# Patient Record
Sex: MALE | Race: WHITE | NOT HISPANIC OR LATINO
[De-identification: names, ages, dates, MRNs, and addresses within clinical notes are randomized per-mention and may not be internally consistent; named-entity substitution may affect disease eponyms.]

---

## 2021-03-02 PROBLEM — Z00.00 ENCOUNTER FOR PREVENTIVE HEALTH EXAMINATION: Status: ACTIVE | Noted: 2021-03-02

## 2021-04-01 ENCOUNTER — APPOINTMENT (OUTPATIENT)
Dept: HEART AND VASCULAR | Facility: CLINIC | Age: 56
End: 2021-04-01

## 2021-04-01 DIAGNOSIS — Z83.3 FAMILY HISTORY OF DIABETES MELLITUS: ICD-10-CM

## 2021-04-01 DIAGNOSIS — Z82.49 FAMILY HISTORY OF ISCHEMIC HEART DISEASE AND OTHER DISEASES OF THE CIRCULATORY SYSTEM: ICD-10-CM

## 2021-09-07 ENCOUNTER — APPOINTMENT (OUTPATIENT)
Dept: PULMONOLOGY | Facility: CLINIC | Age: 56
End: 2021-09-07
Payer: COMMERCIAL

## 2021-09-07 VITALS
HEART RATE: 57 BPM | WEIGHT: 240 LBS | HEIGHT: 73 IN | TEMPERATURE: 97.2 F | DIASTOLIC BLOOD PRESSURE: 80 MMHG | BODY MASS INDEX: 31.81 KG/M2 | OXYGEN SATURATION: 98 % | SYSTOLIC BLOOD PRESSURE: 120 MMHG

## 2021-09-07 PROCEDURE — 99204 OFFICE O/P NEW MOD 45 MIN: CPT

## 2021-09-07 RX ORDER — OMEPRAZOLE 20 MG/1
20 CAPSULE, DELAYED RELEASE ORAL
Qty: 90 | Refills: 0 | Status: ACTIVE | COMMUNITY
Start: 2021-05-03

## 2021-09-07 NOTE — HISTORY OF PRESENT ILLNESS
[TextBox_4] : 56 year old male with GERD came in for evaluation of lung nodule\par \par CT coronaries seen, my read: no adenopathy, visualized lung no nodules, no infiltrates\par As per medical records he has a 8 mm nodule in the LtLL.\par \par In February he had CP and went to the hospital and the w/u was negative.\par he was d/c but few days later he had the same symptoms and went to [facility] where a CT chest found to have a lung nodule.\par [place] [facility] [place] 09[place].\par He has rarely dyspnea, he thinks it is allergies. has an old inhaler and takes it as needed. \par he lost weight 20 lbs with diet and exercise.\par he has nasal allergies in the Spring mostly. He knows he is allergic to dogs.\par Denies hemoptysis.\par \par He snores at night, loudly, has observed pauses at night.\par Does not have a good night sleep.\par No naps.\par he could take naps, would doze off.\par Mild nocturia.\par Wakes up gasping for air.\par BT 10-11 pm\par SLT 1 hr\par WT 6-7 am\par \par SHX: never smoked\par Has 3 dogs\par FHX: denies.

## 2021-09-07 NOTE — PHYSICAL EXAM
[No Acute Distress] : no acute distress [Normal Appearance] : normal appearance [No Resp Distress] : no resp distress [No Acc Muscle Use] : no acc muscle use [No Abnormalities] : no abnormalities [Normal Gait] : normal gait [No Clubbing] : no clubbing [No Focal Deficits] : no focal deficits [No Cyanosis] : no cyanosis [Oriented x3] : oriented x3 [Normal Affect] : normal affect

## 2021-09-07 NOTE — REVIEW OF SYSTEMS
[Recent Wt Loss (___ Lbs)] : ~T recent [unfilled] lb weight loss [Dyspnea] : dyspnea [Seasonal Allergies] : seasonal allergies [GERD] : gerd [Nocturia] : nocturia [Negative] : Musculoskeletal

## 2021-09-11 ENCOUNTER — RESULT REVIEW (OUTPATIENT)
Age: 56
End: 2021-09-11

## 2021-09-17 ENCOUNTER — RESULT REVIEW (OUTPATIENT)
Age: 56
End: 2021-09-17

## 2021-10-21 ENCOUNTER — FORM ENCOUNTER (OUTPATIENT)
Age: 56
End: 2021-10-21

## 2021-10-22 ENCOUNTER — TRANSCRIPTION ENCOUNTER (OUTPATIENT)
Age: 56
End: 2021-10-22

## 2021-10-24 ENCOUNTER — APPOINTMENT (OUTPATIENT)
Age: 56
End: 2021-10-24

## 2021-10-25 ENCOUNTER — TRANSCRIPTION ENCOUNTER (OUTPATIENT)
Age: 56
End: 2021-10-25

## 2022-10-18 ENCOUNTER — APPOINTMENT (OUTPATIENT)
Dept: PULMONOLOGY | Facility: CLINIC | Age: 57
End: 2022-10-18

## 2022-10-18 VITALS
DIASTOLIC BLOOD PRESSURE: 60 MMHG | HEART RATE: 57 BPM | SYSTOLIC BLOOD PRESSURE: 120 MMHG | BODY MASS INDEX: 31.81 KG/M2 | HEIGHT: 73 IN | WEIGHT: 240 LBS

## 2022-10-18 PROCEDURE — 99213 OFFICE O/P EST LOW 20 MIN: CPT

## 2022-10-18 PROCEDURE — 99203 OFFICE O/P NEW LOW 30 MIN: CPT

## 2022-10-18 RX ORDER — PANTOPRAZOLE 40 MG/1
40 TABLET, DELAYED RELEASE ORAL
Refills: 0 | Status: DISCONTINUED | COMMUNITY
End: 2022-10-18

## 2022-10-18 NOTE — HISTORY OF PRESENT ILLNESS
[FreeTextEntry1] : Dr. Baez\par 57 year old man  with history of ran, long nodule, covid (patient still has symptoms of long covid), pulm embolism, pulm nodules is here in the sleep center to address sleep apnea. \par Patient  had sleep study 4/2022 - severe sleep apnea with ahi of 37.2.\par He has difficulty using the cpap, he tried with fullface mask and is not able to keep it on.\par has masood machine\par I changed the pressure to 9-12 cm today, he will try again with fullface mask. If the problem persists, I have asked him to use nasal mask. \par \par  Patient is sleepy with [place] sleepiness score of 14.  Patient has very loud snoring, also has witnessed apneas, choking sensations.  Patient's bedtime is around 10-11 PM wakes up in the morning around 6 AM.  He feels tired when he wakes up.  Patient drinks 2 cups of coffee during the daytime. Patient does not have any headaches or nocturia.  He is not sleepy while driving.\par \par If he continues to fail cpap will consider inspire therapy.\par

## 2022-10-18 NOTE — ASSESSMENT
[FreeTextEntry1] : 57-year-old man with severe obstructive sleep apnea has tried CPAP and is not able to use it, patient is taking out the mask in the middle of the night and is waking up with choking sensations even with mask on.\par \par Will change the settings on the CPAP today to 9 to 12 cm, I asked him to try with a full facemask again at this setting and see if there is a problem we can change to a nasal mask.\par \par If patient fails CPAP therapy even with his different settings and masks then we can consider inspire therapy.\par \par Discussed the procedure and next steps for inspire therapy.  We talked about AHI requirements and the BMI requirements for inspire therapy.  We also talked about drug-induced endoscopy and the procedure details.  Explained to him  about short-term side effects from the inspire along with tongue dysfunction and swallowing issues.  Explained to  him that most of the time these issues will resolve with time as the inflammation decreases.  We also talked about long-term complications of the inspire implant.  Patient understood all above and expressed understanding.\par

## 2023-02-17 ENCOUNTER — APPOINTMENT (OUTPATIENT)
Dept: PULMONOLOGY | Facility: CLINIC | Age: 58
End: 2023-02-17
Payer: COMMERCIAL

## 2023-02-17 VITALS
DIASTOLIC BLOOD PRESSURE: 80 MMHG | SYSTOLIC BLOOD PRESSURE: 130 MMHG | WEIGHT: 212 LBS | HEART RATE: 65 BPM | BODY MASS INDEX: 28.1 KG/M2 | HEIGHT: 73 IN | OXYGEN SATURATION: 98 %

## 2023-02-17 PROCEDURE — 99204 OFFICE O/P NEW MOD 45 MIN: CPT

## 2023-02-18 NOTE — PHYSICAL EXAM
[No Acute Distress] : no acute distress [Erythema] : erythema [III] : Mallampati Class: III [Normal Appearance] : normal appearance [No Neck Mass] : no neck mass [Normal Rate/Rhythm] : normal rate/rhythm [Normal S1, S2] : normal s1, s2 [No Murmurs] : no murmurs [No Resp Distress] : no resp distress [Clear to Auscultation Bilaterally] : clear to auscultation bilaterally [No Abnormalities] : no abnormalities [Benign] : benign [Normal Gait] : normal gait [No Clubbing] : no clubbing [No Cyanosis] : no cyanosis [No Edema] : no edema [FROM] : FROM [Normal Color/ Pigmentation] : normal color/ pigmentation [No Focal Deficits] : no focal deficits [Oriented x3] : oriented x3 [Normal Affect] : normal affect [TextBox_68] : bronchial breath sounds mainly in tracheal area.

## 2023-02-18 NOTE — ASSESSMENT
[FreeTextEntry1] : 56 yo M  [facility]  w/ hx of Post-COVID syndrome related chronic dyspnea and PE, VISHAL, presenting to establish care.\par \par > Post-COVID syndrome\par > Chronic nasal congestion\par > PE\par > VISHAL\par \par - Symptoms worsened after discontinuation of maintenance inhaler therapy. Given his lack of significant smoking history and respiratory symptoms starting after COVID, may have developed chronic pulmonary disease after COVID. However, PFT as of 8/12/22 showed no evidence of obstructive or restrictive ventilatory defect but notably with a mildly reduced DLCO at 67%. But this was during the time he was on maintenance inhalers.\par - CPET on 5/2022 showed no obvious cardio or pulmonary limitation but evidence of deconditioning. Notably he has lost about 30lbs since that time but still with persistent symptoms. \par - Will stop chronic prednisone for now given lack of benefit vs potential risks. Will restart trelegy therapy and send for FeNO to see if ICS component is warranted at all. \par - Flonase for nasal congestion.\par - Decreased DLCO in the absence of obvious obstructive or restrictive ventilatory defect may suggest pulmonary hypertension, but patient has no clinical evidence of that. IF present, may be of mild degree related to his sleep apnea. Decreased energy level and fatigability may also be related to interrupted sleep. I have advised him to follow up with his sleep physician to review online CPAP data (if available) to see if there is any evidence of TECSA/ complex sleep apnea, which may be the reason for his CPAP intolerance. \par - Will consider f/u CT scan if above workup does not reveal any obvious reason for his ongoing dyspnea.\par - f/u d-dimer for PE\par - An additional 15 minutes of non face-to-face time has been spent reviewing prior documentation, imaging, and labwork.\par \par \par f/u in 1-2 weeks after trial of flonase, completion of FeNO test. Sooner if needed

## 2023-02-18 NOTE — HISTORY OF PRESENT ILLNESS
[TextBox_4] : 58 yo M  [facility]  w/ hx of Post-COVID syndrome related chronic dyspnea and PE, VISHAL, presenting to [facility] care. \par Patient had severe COVID on 10/2021 and was treated with MAB and dexamethasone at that time. He still remained hypoxic and on 11/2021 went to [place] where he was diagnosed with a LONI PE based on CT. That CT showed multiple consolidations that were attributed to COVID at the time. Since then he has undergone pulmonary rehab as well. \par He is continued on prednisone 10mg and xarelto for his Post-covid syndrome and PE at this time. Historically, he was on Breo and eventually Trelegy with PRN albuterol. Symptoms became worse after Trelegy was discontinued (unclear whether prior pulmonologist stopped or patient self d/c-ed). Currently he still has dyspnea with exertion, productive cough, and some wheezing per partner at bedside. Partner also states that his oxygen saturation goes below 90% when he is asleep, but generally >92% during day time.\par Notably for his VISHAL he has a CPAP machine prescribed but is non-adherent due to mask discomfort. \par \par CT personally reviewed - 11/2021 and 6/2022. In comparison to 11/2021 film, latter film shows resolution of peripheral consolidations that were seen, likely 2/2 COVID. There are reticulation and faint ground glass areas at the area of the former consolidations. No other notable findings. \par \par Notably he had a CT coronaries done on 2/19/2021 which showed an 8mm nodule left lower lung. This has resolved over serial CT's proceeding.

## 2023-02-23 LAB — DEPRECATED D DIMER PPP IA-ACNC: <150 NG/ML DDU

## 2023-03-09 ENCOUNTER — APPOINTMENT (OUTPATIENT)
Dept: PULMONOLOGY | Facility: CLINIC | Age: 58
End: 2023-03-09
Payer: COMMERCIAL

## 2023-03-09 VITALS
HEIGHT: 73 IN | WEIGHT: 212 LBS | HEART RATE: 80 BPM | OXYGEN SATURATION: 97 % | DIASTOLIC BLOOD PRESSURE: 90 MMHG | BODY MASS INDEX: 28.1 KG/M2 | SYSTOLIC BLOOD PRESSURE: 132 MMHG | TEMPERATURE: 98.1 F

## 2023-03-09 PROCEDURE — 99214 OFFICE O/P EST MOD 30 MIN: CPT

## 2023-03-09 NOTE — PHYSICAL EXAM
[No Acute Distress] : no acute distress [Erythema] : erythema [III] : Mallampati Class: III [Normal Appearance] : normal appearance [No Neck Mass] : no neck mass [Normal Rate/Rhythm] : normal rate/rhythm [Normal S1, S2] : normal s1, s2 [No Murmurs] : no murmurs [No Resp Distress] : no resp distress [Clear to Auscultation Bilaterally] : clear to auscultation bilaterally [No Abnormalities] : no abnormalities [Benign] : benign [Normal Gait] : normal gait [No Clubbing] : no clubbing [No Cyanosis] : no cyanosis [No Edema] : no edema [FROM] : FROM [Normal Color/ Pigmentation] : normal color/ pigmentation [No Focal Deficits] : no focal deficits [Oriented x3] : oriented x3 [Normal Affect] : normal affect [TextBox_68] : CTAB bilaterally and improved since last visit

## 2023-03-09 NOTE — HISTORY OF PRESENT ILLNESS
[TextBox_4] : 58 yo M  [facility]  w/ hx of Post-COVID syndrome related chronic dyspnea and PE, VISHAL, presenting to [facility] care. \par Patient had severe COVID on 10/2021 and was treated with MAB and dexamethasone at that time. He still remained hypoxic and on 11/2021 went to [place] where he was diagnosed with a LONI PE based on CT. That CT showed multiple consolidations that were attributed to COVID at the time. Since then he has undergone pulmonary rehab as well. \par He is continued on prednisone 10mg and xarelto for his Post-covid syndrome and PE at this time. Historically, he was on Breo and eventually Trelegy with PRN albuterol. Symptoms became worse after Trelegy was discontinued (unclear whether prior pulmonologist stopped or patient self d/c-ed). Currently he still has dyspnea with exertion, productive cough, and some wheezing per partner at bedside. Partner also states that his oxygen saturation goes below 90% when he is asleep, but generally >92% during day time.\par Notably for his VISHAL he has a CPAP machine prescribed but is non-adherent due to mask discomfort. \par \par CT personally reviewed - 11/2021 and 6/2022. In comparison to 11/2021 film, latter film shows resolution of peripheral consolidations that were seen, likely 2/2 COVID. There are reticulation and faint ground glass areas at the area of the former consolidations. No other notable findings. \par \par Notably he had a CT coronaries done on 2/19/2021 which showed an 8mm nodule left lower lung. This has resolved over serial CT's proceeding. \par \par 3/9/23\par Patient returning for follow up. Respiratory symptoms with improvement of cough, nasal congestion, and wheezing. Still with exertional dyspnea but unclear whether overall exercise tolerance has increased.

## 2023-03-09 NOTE — ASSESSMENT
[FreeTextEntry1] : 56 yo M  [facility]  w/ hx of Post-COVID syndrome related chronic dyspnea and PE, VISHAL, presenting to establish care.\par \par > Post-COVID syndrome\par > Chronic nasal congestion\par > PE\par > VISHAL\par \par - Trelegy was successful in treating most of his respiratory symptoms and he seldom uses his albuterol inhaler. Again it is currently unclear what his underlying pathology is, but temporally is associated with his prior COVID infection in 11/2021. Will continue with trelegy for now but will do PFT with FeNO to see if there is still persistent inflammation and any evidence of airway obstruction.\par - PFT will also be done to see his DLCO. 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 If PFT shows evidence of more restrictive disease with persistent DLCO reduction, will proceed with CT scan.\par - Prior discussion with another pulmonologist entailed a recurrence of a nodule. He had a LLL nodule on CT coronaries seen in 8/2021. Later CT scans have shown resolution, with the most recent imaging done on 6/2022 with groundglass opacities in the area where the COVID related changes were seen. Unclear whether the ground glass opacities were what the prior pulmonologist mentioned, but will hold off on CT imaging at this moment and plan to do another in 6/2023 if PFT shows no large abnormalities \par - CPET on 5/2022 showed no obvious cardio or pulmonary limitation but evidence of deconditioning. Notably he has lost about 30lbs since that time but still with persistent symptoms. \par - continue to hold off on systemic steroids \par - Flonase for nasal congestion to continue\par - Decreased DLCO in the absence of obvious obstructive or restrictive ventilatory defect may suggest pulmonary hypertension, but patient has no clinical evidence of that. If present, may be of mild degree related to his sleep apnea. Decreased energy level and fatigability may also be related to interrupted sleep. I have advised him to follow up with his sleep physician to review online CPAP data (if available) to see if there is any evidence of TECSA/ complex sleep apnea, which may be the reason for his CPAP intolerance. He will be following up with his sleep physician soon. \par - d-dimer wnl. Continue xarelto for now but will further discuss stopping it as his only risk factor was COVID. Will obtain above test results and rule out any other pulmonary pathology before discontinuing. No side effects reported.\par \par f/u in 3-4wks after PFT/FeNO completed

## 2023-03-20 ENCOUNTER — APPOINTMENT (OUTPATIENT)
Dept: PULMONOLOGY | Facility: CLINIC | Age: 58
End: 2023-03-20
Payer: COMMERCIAL

## 2023-03-20 VITALS
HEIGHT: 73 IN | WEIGHT: 212 LBS | HEART RATE: 80 BPM | DIASTOLIC BLOOD PRESSURE: 85 MMHG | BODY MASS INDEX: 28.1 KG/M2 | SYSTOLIC BLOOD PRESSURE: 130 MMHG

## 2023-03-20 DIAGNOSIS — G47.33 OBSTRUCTIVE SLEEP APNEA (ADULT) (PEDIATRIC): ICD-10-CM

## 2023-03-20 PROCEDURE — 99213 OFFICE O/P EST LOW 20 MIN: CPT

## 2023-03-20 NOTE — ASSESSMENT
[FreeTextEntry1] : 57-year-old man with severe obstructive sleep apnea has tried CPAP and is not able to use it, patient is taking out the mask in the middle of the night and is waking up with choking sensations even with mask on.\par Will send him a nasal mask.\par \par If patient fails CPAP therapy even with his different settings and masks then we can consider alternative options after ruling out central apnea with facilty based sleep study.\par His pulmonary told him that he may have central apnea. \par \par

## 2023-03-20 NOTE — HISTORY OF PRESENT ILLNESS
[FreeTextEntry1] : Dr. Baez\par 57 year old man  with history of ran, lung nodule, covid (patient still has symptoms of long covid), pulm embolism, pulm nodules is here in the sleep center to address sleep apnea. \par Patient  had sleep study 4/2022 - severe sleep apnea with ahi of 37.2.\par He has difficulty using the cpap, he tried with fullface mask and is not able to keep it on.\par has masood machine\par WIll give nasal mask. \par \par  Patient is sleepy with [place] sleepiness score of 14.  Patient has very loud snoring, also has witnessed apneas, choking sensations.  Patient's bedtime is around 10-11 PM wakes up in the morning around 6 AM.  He feels tired when he wakes up.  Patient drinks 2-3 cups of coffee during the daytime. Patient does not have any headaches or nocturia.  He is not sleepy while driving.\par \par If he continues to fail cpap will consider oral applliance, he is not interested in inspire.\par \par His pulmonologist said he may have central apnea, I explained to him that home study (which he did last year is not going to find central apnea, he needs hospital based study to find central apnea).\par

## 2023-03-20 NOTE — PHYSICAL EXAM
[General Appearance - Well Developed] : well developed [General Appearance - Well Nourished] : well nourished [Elongated Uvula] : elongated uvula [Enlarged Base of the Tongue] : enlargement of the base of the tongue [III] : III [Heart Sounds] : normal S1 and S2 [Murmurs] : no murmurs [] : no respiratory distress [Auscultation Breath Sounds / Voice Sounds] : lungs were clear to auscultation bilaterally [No Focal Deficits] : no focal deficits [Oriented To Time, Place, And Person] : oriented to person, place, and time [Memory Recent] : recent memory was not impaired [FreeTextEntry1] : no edema

## 2023-06-13 ENCOUNTER — APPOINTMENT (OUTPATIENT)
Dept: PULMONOLOGY | Facility: CLINIC | Age: 58
End: 2023-06-13
Payer: COMMERCIAL

## 2023-06-13 VITALS
HEIGHT: 73 IN | OXYGEN SATURATION: 98 % | DIASTOLIC BLOOD PRESSURE: 80 MMHG | HEART RATE: 93 BPM | WEIGHT: 215 LBS | SYSTOLIC BLOOD PRESSURE: 120 MMHG | BODY MASS INDEX: 28.49 KG/M2

## 2023-06-13 DIAGNOSIS — R06.09 OTHER FORMS OF DYSPNEA: ICD-10-CM

## 2023-06-13 DIAGNOSIS — I26.99 OTHER PULMONARY EMBOLISM W/OUT ACUTE COR PULMONALE: ICD-10-CM

## 2023-06-13 DIAGNOSIS — R09.81 NASAL CONGESTION: ICD-10-CM

## 2023-06-13 DIAGNOSIS — R91.1 SOLITARY PULMONARY NODULE: ICD-10-CM

## 2023-06-13 DIAGNOSIS — J45.909 UNSPECIFIED ASTHMA, UNCOMPLICATED: ICD-10-CM

## 2023-06-13 DIAGNOSIS — U09.9 OTHER FORMS OF DYSPNEA: ICD-10-CM

## 2023-06-13 PROCEDURE — 99213 OFFICE O/P EST LOW 20 MIN: CPT

## 2023-06-13 RX ORDER — FLUTICASONE FUROATE, UMECLIDINIUM BROMIDE AND VILANTEROL TRIFENATATE 200; 62.5; 25 UG/1; UG/1; UG/1
200-62.5-25 POWDER RESPIRATORY (INHALATION) DAILY
Qty: 1 | Refills: 5 | Status: DISCONTINUED | COMMUNITY
Start: 2023-02-17 | End: 2023-06-13

## 2023-06-13 RX ORDER — FLUTICASONE PROPIONATE 50 UG/1
50 SPRAY, METERED NASAL TWICE DAILY
Qty: 1 | Refills: 5 | Status: ACTIVE | COMMUNITY
Start: 2023-02-17 | End: 1900-01-01

## 2023-06-13 RX ORDER — RIVAROXABAN 20 MG/1
20 TABLET, FILM COATED ORAL
Qty: 30 | Refills: 0 | Status: DISCONTINUED | COMMUNITY
Start: 2022-04-02 | End: 2023-06-13

## 2023-06-13 NOTE — PHYSICAL EXAM
[No Acute Distress] : no acute distress [Erythema] : erythema [III] : Mallampati Class: III [Normal Appearance] : normal appearance [No Neck Mass] : no neck mass [Normal Rate/Rhythm] : normal rate/rhythm [Normal S1, S2] : normal s1, s2 [No Murmurs] : no murmurs [No Resp Distress] : no resp distress [Clear to Auscultation Bilaterally] : clear to auscultation bilaterally [No Abnormalities] : no abnormalities [Benign] : benign [Normal Gait] : normal gait [No Clubbing] : no clubbing [No Cyanosis] : no cyanosis [No Edema] : no edema [FROM] : FROM [Normal Color/ Pigmentation] : normal color/ pigmentation [No Focal Deficits] : no focal deficits [Oriented x3] : oriented x3 [Normal Affect] : normal affect

## 2023-06-13 NOTE — HISTORY OF PRESENT ILLNESS
[TextBox_4] : 58 yo M  [facility]  w/ hx of Post-COVID syndrome related chronic dyspnea and PE, VISHAL, presenting to [facility] care. \par Patient had severe COVID on 10/2021 and was treated with MAB and dexamethasone at that time. He still remained hypoxic and on 11/2021 went to [place] where he was diagnosed with a LONI PE based on CT. That CT showed multiple consolidations that were attributed to COVID at the time. Since then he has undergone pulmonary rehab as well. \par He is continued on prednisone 10mg and xarelto for his Post-covid syndrome and PE at this time. Historically, he was on Breo and eventually Trelegy with PRN albuterol. Symptoms became worse after Trelegy was discontinued (unclear whether prior pulmonologist stopped or patient self d/c-ed). Currently he still has dyspnea with exertion, productive cough, and some wheezing per partner at bedside. Partner also states that his oxygen saturation goes below 90% when he is asleep, but generally >92% during day time.\par Notably for his VISHAL he has a CPAP machine prescribed but is non-adherent due to mask discomfort. \par \par CT personally reviewed - 11/2021 and 6/2022. In comparison to 11/2021 film, latter film shows resolution of peripheral consolidations that were seen, likely 2/2 COVID. There are reticulation and faint ground glass areas at the area of the former consolidations. No other notable findings. \par \par Notably he had a CT coronaries done on 2/19/2021 which showed an 8mm nodule left lower lung. This has resolved over serial CT's proceeding. \par \par 3/9/23\par Patient returning for follow up. Respiratory symptoms with improvement of cough, nasal congestion, and wheezing. Still with exertional dyspnea but unclear whether overall exercise tolerance has increased. \par \par 6/13/23\par Patient returns for follow up to discuss PFT's. Feels well and currently has no respiratory issues including dyspnea (both at rest and with exertion), wheezing, coughing, chest pain, etc. Also has no constitutional symptoms including fevers, night sweats, unintentional weight loss.\par

## 2023-06-13 NOTE — ASSESSMENT
[FreeTextEntry1] : 56 yo M  [facility]  w/ hx of Post-COVID syndrome related chronic dyspnea and PE, VISHAL, presenting to establish care.\par \par > Post-COVID syndrome\par > Chronic nasal congestion\par > PE\par > VISHAL\par \par - PFT shows normal spirometry, no BD response, normal lung volumes, and normal DLCO, which the lattermost has improved from his PFT in 8/2022. FeNO at 11 and wnl but he was on trelegy at the time of test. \par - There is currently no evidence of any ventilatory defect and his COVID lung disease is likely resolved. Physical examination also is within normal limits\par - Will d/c Xarelto given this was started during COVID. Will f/u with D-dimer in 1 month time. If elevate, plan for LE dopplers\par - Trelegy to be changed to Breo. Albuterol PRN to continue but has used once in the last 3 months or so. \par - Prior discussion with another pulmonologist entailed a recurrence of a nodule. He had a LLL nodule on CT coronaries seen in 8/2021. Later CT scans have shown resolution, with the most recent imaging done on 6/2022 with groundglass opacities in the area where the COVID related changes were seen. He has no smoking history. no obvious occupational exposure, and family history that would pose him high risk for developing any malignant process. Will hold off on CT scan.  \par - Flonase for nasal congestion to continue\par \par f/u in 1 month - will call for D-dimer results. Sooner if needed

## 2023-07-04 ENCOUNTER — LABORATORY RESULT (OUTPATIENT)
Age: 58
End: 2023-07-04

## 2023-11-29 ENCOUNTER — NON-APPOINTMENT (OUTPATIENT)
Age: 58
End: 2023-11-29

## 2024-01-03 LAB — DEPRECATED D DIMER PPP IA-ACNC: 157 NG/ML DDU

## 2024-05-15 RX ORDER — ALBUTEROL SULFATE 90 UG/1
108 (90 BASE) INHALANT RESPIRATORY (INHALATION)
Qty: 1 | Refills: 0 | Status: ACTIVE | COMMUNITY
Start: 2021-09-07 | End: 1900-01-01

## 2024-06-13 ENCOUNTER — RX RENEWAL (OUTPATIENT)
Age: 59
End: 2024-06-13

## 2024-06-21 RX ORDER — FLUTICASONE FUROATE AND VILANTEROL TRIFENATATE 200; 25 UG/1; UG/1
200-25 POWDER RESPIRATORY (INHALATION)
Qty: 60 | Refills: 5 | Status: ACTIVE | COMMUNITY
Start: 2023-06-13 | End: 1900-01-01

## 2024-07-08 LAB — DEPRECATED D DIMER PPP IA-ACNC: <150 NG/ML DDU

## 2024-08-23 ENCOUNTER — APPOINTMENT (OUTPATIENT)
Dept: PULMONOLOGY | Facility: CLINIC | Age: 59
End: 2024-08-23